# Patient Record
(demographics unavailable — no encounter records)

---

## 2025-04-02 NOTE — HISTORY OF PRESENT ILLNESS
[FreeTextEntry1] : Anuradha, a patient with a history of breast cancer, was initially diagnosed in 2008 and underwent a lumpectomy in her right breast. In February of this year, she was diagnosed with a recurrence. The current tumors are described as smaller and less aggressive compared to her initial HER2 positive tumor 17 years ago. Due to her previous radiation treatment, Dr. Barreto has recommended a mastectomy with reconstruction for the right breast, and a procedure to match the left breast. The patient lives in [place], [place], but has family and support in [place], where she prefers to have the procedure done. - Past Medical History : 1. Breast cancer (2008) 2. Hypertension 3. Hypercholesterolemia 4. Back issues (arthritis-related) 5. Allergies 6. Asthma - Past Surgical History : 1. Lumpectomy of right breast (2008) 2. Bunion surgery on both feet 3. D&C for miscarriage (1991) - Family History : - Social History : - Former smoker (quit in December, 6 months ago) - Lives in [place], [place] - Has family support in [place]

## 2025-04-02 NOTE — PHYSICAL EXAM
[de-identified] :  No acute distress, AOx3 [de-identified] :  EOMI, visual acuity intact, no diplopia [de-identified] : Non labored breathing, no acute distress or SOB noted [de-identified] : please refer to scanned docment for measurements right breast previous scars, some radiation changes noted.  13 bw 5 projection

## 2025-04-02 NOTE — PHYSICAL EXAM
[de-identified] :  No acute distress, AOx3 [de-identified] :  EOMI, visual acuity intact, no diplopia [de-identified] : Non labored breathing, no acute distress or SOB noted [de-identified] : please refer to scanned docment for measurements right breast previous scars, some radiation changes noted.  13 bw 5 projection

## 2025-04-02 NOTE — ASSESSMENT
[FreeTextEntry1] : We discussed the different options for breast reconstruction after mastectomy including autologous tissue and expander-implant reconstruction techniques.  I explained the variations of autologous tissue breast reconstruction using the abdominal tissues. She understands that free flap reconstruction involves microsurgical transfer of tissues from the lower abdominal region to the chest area and is a longer operative procedure with a longer recovery Specific to free flap reconstruction, she understands that microsurgical techniques are required to transfer tissue up to the chest area and that there is a risk of flap failure and loss on the order of less than 5%. Free flap reconstruction may also require emergent reoperation for possible salvage of the free flap in the event of vessel thrombosis. She understands that breast reconstruction using autologous tissue is a multi-staged procedure and that each stage is typically  by 3-4 months. She also understands that in unilateral reconstruction and also in bilateral reconstruction, symmetry with the contralateral breast is impossible to achieve and that we will strive to do our best to create symmetry at the outset with the autologous tissues from the lower abdominal region. She may require subsequent surgery of either the ipselater or contralateral breast or both to reach closer to symmetry.  I also discussed two stage tissue expander-implant based reconstruction. She understands that reconstruction using expander-implant techniques is a shorter operative procedure relative to reconstruction using free-flap techniques. She understands that acellular dermal matrix is a biologic implant that has been treated to remove cellular material and to act as a scaffold for her own cells to incorporate into and or using a mesh.    She understands that intermittent visits would be required to fill the expander to the final volume and that a second procedure will be required to remove the expander and replace it with a saline or silicone implant. She also understands that complete symmetry with the contralateral normal or reconstructed breast is impossible to achieve using implant-expander techniques. I explained to her that the risk of potential complications associated with implant reconstruction include: capsular contracture, wound breakdown, infection, and potential implant exposure. I also explained to her that radiation therapy, significantly increases the risk of capsular contracture and other complications. We also discussed associated long-term complications with implants including capsular contracture resulting in asymmetry, the need for removal and replacement of the implants over time, possible malposition, possible implant exposure, possible infection, and potential leak or rupture of the implant. Due to the fact pt had radiation previous she is at a higher risk for failure of the tissue expander.  Pt understands at this time pt would NOT like to proceed with Autologus recon.  Pt would like to proceed with implant based recon If implant based recon fails will proceed with autologous recon  will coordinate with Breast surgery date of operation

## 2025-04-02 NOTE — HISTORY OF PRESENT ILLNESS
[FreeTextEntry1] : Anuradha, a patient with a history of breast cancer, was initially diagnosed in 2008 and underwent a lumpectomy in her right breast. In February of this year, she was diagnosed with a recurrence. The current tumors are described as smaller and less aggressive compared to her initial HER2 positive tumor 17 years ago. Due to her previous radiation treatment, Dr. Barreto has recommended a mastectomy with reconstruction for the right breast, and a procedure to match the left breast. The patient lives in [place], [place], but has family and support in [place], where she prefers to have the procedure done. - Past Medical History : 1. Breast cancer (2008) 2. Hypertension 3. Hypercholesterolemia 4. Back issues (arthritis-related) 5. Allergies 6. Asthma - Past Surgical History : 1. Lumpectomy of right breast (2008) 2. Bunion surgery on both feet 3. D&C for miscarriage (1991) - Family History : - Social History : - Former smoker (quit in December, 6 months ago) - Lives in [place], [place] - Has family support in [place]

## 2025-04-02 NOTE — HISTORY OF PRESENT ILLNESS
[FreeTextEntry1] : Anuradha, a patient with a history of breast cancer, was initially diagnosed in 2008 and underwent a lumpectomy in her right breast. In February of this year, she was diagnosed with a recurrence. The current tumors are described as smaller and less aggressive compared to her initial HER2 positive tumor 17 years ago. Due to her previous radiation treatment, Dr. Barreto has recommended a mastectomy with reconstruction for the right breast, and a procedure to match the left breast. The patient lives in [place], [place], but has family and support in [place], where she prefers to have the procedure done. inteval hx 4/2/25: Discussed options of Implant based vs autologus, Pt would like to proceed with implant based.

## 2025-04-02 NOTE — ASSESSMENT
[FreeTextEntry1] : Breast Cancer Recurrence : Patient presents with recurrence of breast cancer in the right breast, smaller and less aggressive than the initial diagnosis. Previous radiation to the area limits treatment options. - Discuss case with Dr. Barreto to confirm surgical plan and extent of mastectomy - Consider two main reconstruction options: tissue expander/implant or free flap - Schedule follow-up appointment next week to discuss reconstruction options in detail - Educate patient on smoking cessation importance for reconstruction eligibility - Perform nicotine test before surgery to ensure compliance - Explain potential limitations of free flap reconstruction due to limited abdominal tissue - Discuss post-operative care, including drain management and recovery time - Address patient's concerns about future cancer detection with implants - Explain loss of sensation in reconstructed breast - Provide information on hospital stay duration based on chosen reconstruction method

## 2025-04-02 NOTE — PHYSICAL EXAM
[de-identified] :  No acute distress, AOx3 [de-identified] :  EOMI, visual acuity intact, no diplopia [de-identified] : Non labored breathing, no acute distress or SOB noted [de-identified] : differed

## 2025-04-24 NOTE — PLAN
[FreeTextEntry1] : breast exam not performed. referred for a pelvic sonogram to rule out thick endometrium rto 1 year

## 2025-04-24 NOTE — HISTORY OF PRESENT ILLNESS
[FreeTextEntry1] : Pt in office for annual exam. Patient recently diagnosed with a recurrence of right breast cancer. Being evaluated for right mastectomy and reconstruction  also had episode of vaginal spotting during sexual foreplay not related to intercourse bright red blood

## 2025-04-29 NOTE — PHYSICAL EXAM
[Bra Size: _______] : Bra Size: [unfilled] [de-identified] : tethered healed sinus track right upper inner quadrant - previous lumpectomy site, right breast smaller than left, radiation induced fibrosis right breast, no nipple discharge, no ptosis. SN - N: L  28 cm  R 25 cm, BD: L 16.5 cm R 15 cm, Left breast with grade II ptosis and asymmetric with right breast

## 2025-04-29 NOTE — HISTORY OF PRESENT ILLNESS
[FreeTextEntry1] : 62 with recurrence right breast noninvasive cancer referred by Dr. Barreto for initial consultation to discuss breast reconstruction after right mastectomy. She underwent right breast lumpectomy in 2008 with Dr. Benjamin Castillo. She has history of chemotherapy and radiation in [id number]. She took tamoxifen for 5 years and Exemestane stopped in [id number]. She had right breast biopsy on 02/2024 and breast MRI in 03/2025. She had genetic testing done, results negative in 2008. No known family history of breast cancer or ovarian cancer. Mother was adopted. Mother and sister tested negative for BRCA gene. She saw Dr. Saini at [facility] who recommended implant based reconstruction.  No history of bleeding or clotting disorder.

## 2025-04-29 NOTE — ASSESSMENT
[FreeTextEntry1] : I reviewed with Ms. LEMOS in detail the risks, benefits, and alternatives of both implant based breast reconstruction as well as autologous tissue reconstruction. Due to her history of radiation to the right breast and significant radiation changes including tethered scar as well as induration and thickening of the skin implant based reconstruction is not advisable because there would be high risk of complication including infection and failure and low likelihood of good cosmetic outcome and symmetry  I then reviewed with IRVING the details of autologous tissue reconstruction, specifically using a free flap from her lower abdomen.  This operation entails transplanting the skin, the fat, and blood vessels from the lower abdomen up to the chest wall where we reattach the artery and vein to blood vessels on the chest wall behind the rib to re-vascularize the tissue called a "flap" utilizing microsurgical techniques. We attempt to save all of the muscle fibers of the abdominal rectus muscle to minimize the chance of an abdominal wall weakness, bulge or hernia and only transfer the perforating blood vessels. This is called a LUDY flap. I explained to her the scar burden associated with this operation including the scars on the breasts and the lower abdomen and around the umbilicus. I explained to her that there is a risk of free flap loss and vessel thrombosis requiring a return to the operating room for emergent exploration in an attempt to salvage the flap. If we do lose a flap due to vascular compromise, we would likely place a tissue expander at the time of her return to the operating room in order to preserve the breast skin envelope and perform a delayed reconstruction. I reviewed the risks of abdominal wall morbidity including, but not limited to, abdominal wall weakness, hernia or bulge.   The LUDY flap is designed to minimize the risk of abdominal wall morbidity as opposed to a TRAM flap that cuts the abdominal wall muscle, but even a LUDY flap has a small chance of abdominal wall bulge, weakness or hernia. This operation is approximately 6 hours for one side and 9 hours for a bilateral procedure with a three day hospitalization and six week recovery period. I also explained that there is a possibility of contour abnormality, asymmetry between the two breasts, and possible need for revision surgery.We also discussed possible nerve grafting at the time of the reconstruction. The nipple areolar reconstruction is performed at a later date as a separate procedure.

## 2025-04-29 NOTE — CONSULT LETTER
[Consult Letter:] : I had the pleasure of evaluating your patient, [unfilled]. [Please see my note below.] : Please see my note below. [Consult Closing:] : Thank you very much for allowing me to participate in the care of this patient.  If you have any questions, please do not hesitate to contact me. [Sincerely,] : Sincerely, [FreeTextEntry2] : Jacek Barreto MD [address] [place] [address]. [place] [address] [FreeTextEntry3] : Oren Lerman, MD, FACS Cosmetic & Reconstructive Plastic Surgery Associate , Department of Plastic Surgery - [facility] Associate Professor of Surgery - [facility] at [facility] Tel: [phone] Fax: [phone] [email or web address]

## 2025-05-22 NOTE — HISTORY OF PRESENT ILLNESS
[FreeTextEntry1] : Patient Name: IRVING LEMOS : 1963 Date: 2025 Attending: Dr. Oren Lerman   HPI: follow up appointment for bilateral LUDY flap breast reconstruction    Allergies: NKDA Medication prescribed: valium 5 mg, patient advised not to take with PRN ativan, oxycodone 5 mg, - patient educated on when to take, will not take with norco (prescribed for back pain), aspirin 325 mg, ibuprofen 600 mg   ROS: complete 14 point review of systems negative except pertinent items reviewed in the HPI. Other non-contributory items reviewed in our new patient questionnaire and we have submitted it to be scanned into the medical record.   Physical Exam: completed at time of in office evaluation   Assessment/Plan: We have discussed pre and postop instructions, recovery limitations, restrictions and expectations, ERAS protocol, NPO status, transportation home, postop medications, EVELINE drains, compression garments, benefits and risks of the procedure. Pre-op labs reviewed. Echo, stress test, holter monitor pending. Cardiac clearance pending. The patient would like to proceed with surgery as scheduled.   FREDI FITCH NP

## 2025-06-12 NOTE — SURGICAL HISTORY
[de-identified] : 05/29/2025: right breast reconstruction with LUDY flap reinforcement of abdominal wall with mesh after mastectomy with Dr. Barreto

## 2025-06-12 NOTE — SURGICAL HISTORY
[de-identified] : 05/29/2025: right breast reconstruction with LUDY flap reinforcement of abdominal wall with mesh after mastectomy with Dr. Barreto

## 2025-06-12 NOTE — ASSESSMENT
[FreeTextEntry1] : 1 week PO right LUDY flap, doing well Continue aspirin Pain management reviewed - tylenol/ibuprofen Wear sports bra Shower ok Aquaphor to incisions Will f/u w/ NP on Monday with drain totals

## 2025-06-12 NOTE — SURGICAL HISTORY
[de-identified] : 05/29/2025: right breast reconstruction with LUDY flap reinforcement of abdominal wall with mesh after mastectomy with Dr. Barreto

## 2025-06-12 NOTE — HISTORY OF PRESENT ILLNESS
[FreeTextEntry1] : 63 y/o female presents 7 days s/p right breast reconstruction with LUDY flap reinforcement of abdominal wall with mesh after mastectomy with Dr. Barreto on 05/29/2025. Denies any f/n/c/v. She is taking ibuprofen prn. She has 3 EVELINE drains.

## 2025-06-12 NOTE — PHYSICAL EXAM
[de-identified] : Incisions c/d/i, no collections - assessed on US, or s/sx of infection, right flap warm, viable. Vertical incision w/ resolving ecchymosis. EVELINE drain  removed. [de-identified] : Incisions c/d/i, no collections or s/sx of infection, umbo warm, viable. b/l EVELINE drain in place --> serosang fluid, not ready for removal.

## 2025-06-12 NOTE — PHYSICAL EXAM
[de-identified] : Incisions c/d/i, no collections - assessed on US, or s/sx of infection, right flap warm, viable. Vertical incision w/ resolving ecchymosis. EVELINE drain  removed. [de-identified] : Incisions c/d/i, no collections or s/sx of infection, umbo warm, viable. b/l EVELINE drain in place --> serosang fluid, not ready for removal.

## 2025-06-12 NOTE — HISTORY OF PRESENT ILLNESS
Administration History & Physical Completed prior to Circumcision & infant is < or = to 6 hours of age. Preoperative Diagnosis: non-circumcised    Postoperative Diagnosis: circumcised    Risks and benefits of circumcision explained to mother. All questions answered. Consent signed. Time out performed to verify infant and procedure. Infant prepped and draped in normal sterile fashion. 1 cc of  1% Lidocaine used. Anesthesia used:   Sweet Ease/ Pacifier/ 1% PF lidocaine/ Dorsal Penile Block. Goo  1.1 cm  clamp used to perform procedure. Estimated blood loss:  minimal.  Hemostatis noted. Site Care:Vaseline gauze applied and Petroleum jelly to site Sterile petroleum gauze applied to circumcised area. Infant tolerated the procedure well.   Complications:  none  Specimen Disposition: Biohazard Waste      Electronically signed by Luz Ching MD on 2021 at 8:55 AM [FreeTextEntry1] : 63 y/o female presents 7 days s/p right breast reconstruction with LUDY flap reinforcement of abdominal wall with mesh after mastectomy with Dr. Barreto on 05/29/2025. Denies any f/n/c/v. She is taking ibuprofen prn. She has 3 EVELINE drains.

## 2025-06-12 NOTE — PHYSICAL EXAM
[de-identified] : Incisions c/d/i, no collections - assessed on US, or s/sx of infection, right flap warm, viable. Vertical incision w/ resolving ecchymosis. EVELINE drain  removed. [de-identified] : Incisions c/d/i, no collections or s/sx of infection, umbo warm, viable. b/l EVELINE drain in place --> serosang fluid, not ready for removal.

## 2025-06-19 NOTE — PHYSICAL EXAM
[de-identified] : Incisions healing well,  -vertical incision w/ resolving ecchymosis and delayed wound healing, lightly debrided, no collections or s/sx of infection, right flap warm, viable. [de-identified] : Incisions healing well, no collections or s/sx of infection, umbo warm, viable. left EVELINE drain in place --> serosang fluid, not ready for removal. CHG dressing changed. mid incision with 2 mm delayed wound healing with mild surrounding erythema

## 2025-06-19 NOTE — PHYSICAL EXAM
[de-identified] : Incisions healing well,  -vertical incision w/ resolving ecchymosis and delayed wound healing, lightly debrided, no collections or s/sx of infection, right flap warm, viable. [de-identified] : Incisions healing well, no collections or s/sx of infection, umbo warm, viable. left EVELINE drain in place --> serosang fluid, not ready for removal. CHG dressing changed. mid incision with 2 mm delayed wound healing with mild surrounding erythema

## 2025-06-19 NOTE — SURGICAL HISTORY
[de-identified] : 05/29/2025: right breast reconstruction with LUDY flap reinforcement of abdominal wall with mesh after mastectomy with Dr. Barreto

## 2025-06-19 NOTE — HISTORY OF PRESENT ILLNESS
[FreeTextEntry1] : 61 y/o female presents 3 weeks s/p right breast reconstruction with LUDY flap reinforcement of abdominal wall with mesh after mastectomy with Dr. Barreto on 05/29/2025. Denies any f/n/c/v. She had c/o redness and drainage to right breast flap. She is taking Bactrim as prescribed. She has one EVELINE drain in place - left abdomen.

## 2025-06-19 NOTE — ASSESSMENT
[FreeTextEntry1] : 3 weeks PO right LUDY flap, doing well Complete Rx for Bactrim  Wear sports bra Aquaphor to incisions Telfa to right breast vertical incision  RTC in 1 week for Dr. Lerman

## 2025-06-19 NOTE — SURGICAL HISTORY
[de-identified] : 05/29/2025: right breast reconstruction with LUDY flap reinforcement of abdominal wall with mesh after mastectomy with Dr. Barreto

## 2025-06-19 NOTE — HISTORY OF PRESENT ILLNESS
[FreeTextEntry1] : 63 y/o female presents 3 weeks s/p right breast reconstruction with LUDY flap reinforcement of abdominal wall with mesh after mastectomy with Dr. Barreto on 05/29/2025. Denies any f/n/c/v. She had c/o redness and drainage to right breast flap. She is taking Bactrim as prescribed. She has one EVELINE drain in place - left abdomen.

## 2025-06-27 NOTE — ASSESSMENT
[FreeTextEntry1] : 4 weeks PO right LUDY flap, doing well Last drain removed Reviewed PO care instructions Complete Rx for Bactrim  Wear sports bra Aquaphor to incisions continue Collagenase to right vertical incision wound Rx for PT given  RTC in 2 weeks for wound check w/ NP

## 2025-06-27 NOTE — PHYSICAL EXAM
[de-identified] : Incisions healing well, - vertical incision w/ breakdown 2cm x 2.5cm dehiscence, granulation tissue formation at the base. No collections or s/sx of infection, right flap warm, viable.  [de-identified] : Incisions healing well, no collections or s/sx of infection, umbo warm, viable. left EVELINE drain removed.

## 2025-06-27 NOTE — SURGICAL HISTORY
[de-identified] : 05/29/2025: right breast reconstruction with LUDY flap reinforcement of abdominal wall with mesh after mastectomy with Dr. Barreto

## 2025-06-27 NOTE — HISTORY OF PRESENT ILLNESS
[FreeTextEntry1] : 61 y/o female presents 26 days s/p right breast reconstruction with LUDY flap reinforcement of abdominal wall with mesh after mastectomy with Dr. Barreto on 05/29/2025. Denies any f/n/c/v. She is taking ibuprofen prn. She states the redness on the right breast flap and infection on abdomen is getting better. She is taking Bactrim as prescribed. She has 1 EVELINE drain.

## 2025-06-27 NOTE — SURGICAL HISTORY
[de-identified] : 05/29/2025: right breast reconstruction with LUDY flap reinforcement of abdominal wall with mesh after mastectomy with Dr. Barreto

## 2025-06-27 NOTE — PHYSICAL EXAM
[de-identified] : Incisions healing well, - vertical incision w/ breakdown 2cm x 2.5cm dehiscence, granulation tissue formation at the base. No collections or s/sx of infection, right flap warm, viable.  [de-identified] : Incisions healing well, no collections or s/sx of infection, umbo warm, viable. left EVELINE drain removed.

## 2025-06-27 NOTE — SURGICAL HISTORY
[de-identified] : 05/29/2025: right breast reconstruction with LUDY flap reinforcement of abdominal wall with mesh after mastectomy with Dr. Barreto

## 2025-06-27 NOTE — HISTORY OF PRESENT ILLNESS
[FreeTextEntry1] : 63 y/o female presents 26 days s/p right breast reconstruction with LUDY flap reinforcement of abdominal wall with mesh after mastectomy with Dr. Barreto on 05/29/2025. Denies any f/n/c/v. She is taking ibuprofen prn. She states the redness on the right breast flap and infection on abdomen is getting better. She is taking Bactrim as prescribed. She has 1 EVELINE drain.

## 2025-06-27 NOTE — PHYSICAL EXAM
[de-identified] : Incisions healing well, - vertical incision w/ breakdown 2cm x 2.5cm dehiscence, granulation tissue formation at the base. No collections or s/sx of infection, right flap warm, viable.  [de-identified] : Incisions healing well, no collections or s/sx of infection, umbo warm, viable. left EVELINE drain removed.

## 2025-07-10 NOTE — HISTORY OF PRESENT ILLNESS
[FreeTextEntry1] : 61 y/o female presents 6 weeks s/p right breast reconstruction with LUDY flap reinforcement of abdominal wall with mesh after mastectomy with Dr. Barreto on 05/29/2025. Denies any f/n/c/v. prn. She is applying Santyl and wet gauze to her right breast and abdomen wound. She is applying Aquaphor to incisions. She feels a throbbing pain to her right of her right breast, she is taking Advil and applying ice pack to area.

## 2025-07-10 NOTE — SURGICAL HISTORY
[de-identified] : 05/29/2025: right breast reconstruction with LUDY flap reinforcement of abdominal wall with mesh after mastectomy with Dr. Barreto

## 2025-07-10 NOTE — HISTORY OF PRESENT ILLNESS
[FreeTextEntry1] : 63 y/o female presents 6 weeks s/p right breast reconstruction with LUDY flap reinforcement of abdominal wall with mesh after mastectomy with Dr. Barreto on 05/29/2025. Denies any f/n/c/v. prn. She is applying Santyl and wet gauze to her right breast and abdomen wound. She is applying Aquaphor to incisions. She feels a throbbing pain to her right of her right breast, she is taking Advil and applying ice pack to area.

## 2025-07-10 NOTE — PHYSICAL EXAM
[de-identified] : Incisions healing well, - vertical incision w/ breakdown 2cm x 2.cm dehiscence, granulation tissue formation at the base, healing well. No collections or s/sx of infection, right flap warm, viable.  [de-identified] : Incisions healing well, no collections or s/sx of infection, umbo warm, viable.  Wound now closed

## 2025-07-10 NOTE — PHYSICAL EXAM
[de-identified] : Incisions healing well, - vertical incision w/ breakdown 2cm x 2.cm dehiscence, granulation tissue formation at the base, healing well. No collections or s/sx of infection, right flap warm, viable.  [de-identified] : Incisions healing well, no collections or s/sx of infection, umbo warm, viable.  Wound now closed

## 2025-07-10 NOTE — SURGICAL HISTORY
[de-identified] : 05/29/2025: right breast reconstruction with LUDY flap reinforcement of abdominal wall with mesh after mastectomy with Dr. Barreto

## 2025-07-10 NOTE — ASSESSMENT
[FreeTextEntry1] : 6 weeks PO right LUDY flap, doing well Reviewed PO care instructions Wear sports bra during the day Silicone to incisions continue Collagenase to right vertical incision wound Start PT  RTC in 3 weeks for wound check w/ NP

## 2025-07-30 NOTE — ASSESSMENT
[FreeTextEntry1] : 2 months PO right LUDY flap, doing well Reviewed PO care instructions Wear sports bra during the day Silicone to incisions continue Collagenase to right vertical incision wound Continue PT  RTC in 1 month for Dr. Lerman

## 2025-07-30 NOTE — PHYSICAL EXAM
[de-identified] : Incisions healing well, - vertical incision w/ breakdown 2cm x 2.cm continues to heal well. No collections or s/sx of infection, right flap warm, viable.  [de-identified] : Well healed

## 2025-07-30 NOTE — SURGICAL HISTORY
[de-identified] : 05/29/2025: right breast reconstruction with LUDY flap reinforcement of abdominal wall with mesh after mastectomy with Dr. Barreto

## 2025-07-30 NOTE — HISTORY OF PRESENT ILLNESS
[FreeTextEntry1] : 63 y/o female s/p right breast reconstruction with LUDY flap reinforcement of abdominal wall with mesh after mastectomy with Dr. Barreto on 05/29/2025. Denies any f/n/c/v. She is not taking any pain medication. She is applying Santyl to her right breast. She is applying Aquaphor to incisions. She feels an intermittent throbbing pain to her lateral right breast. She has been going to PT at [facility].

## 2025-07-30 NOTE — SURGICAL HISTORY
[de-identified] : 05/29/2025: right breast reconstruction with LUDY flap reinforcement of abdominal wall with mesh after mastectomy with Dr. Barreto

## 2025-07-30 NOTE — PHYSICAL EXAM
[de-identified] : Incisions healing well, - vertical incision w/ breakdown 2cm x 2.cm continues to heal well. No collections or s/sx of infection, right flap warm, viable.  [de-identified] : Well healed